# Patient Record
Sex: FEMALE | Race: WHITE | Employment: OTHER | URBAN - METROPOLITAN AREA
[De-identification: names, ages, dates, MRNs, and addresses within clinical notes are randomized per-mention and may not be internally consistent; named-entity substitution may affect disease eponyms.]

---

## 2017-01-01 ENCOUNTER — DOCUMENTATION ONLY (OUTPATIENT)
Dept: INTERNAL MEDICINE CLINIC | Age: 72
End: 2017-01-01

## 2017-01-01 ENCOUNTER — TELEPHONE (OUTPATIENT)
Dept: INTERNAL MEDICINE CLINIC | Age: 72
End: 2017-01-01

## 2017-01-01 DIAGNOSIS — G95.89 MYELOMALACIA OF CERVICAL CORD (HCC): ICD-10-CM

## 2017-01-01 DIAGNOSIS — G95.89 MYELOMALACIA OF CERVICAL CORD (HCC): Primary | ICD-10-CM

## 2017-01-01 DIAGNOSIS — E03.4 HYPOTHYROIDISM DUE TO ACQUIRED ATROPHY OF THYROID: ICD-10-CM

## 2017-01-01 DIAGNOSIS — I10 ESSENTIAL HYPERTENSION: ICD-10-CM

## 2017-01-01 DIAGNOSIS — M62.81 MUSCLE WEAKNESS (GENERALIZED): ICD-10-CM

## 2017-01-01 DIAGNOSIS — E03.4 HYPOTHYROIDISM DUE TO ACQUIRED ATROPHY OF THYROID: Primary | ICD-10-CM

## 2017-01-01 DIAGNOSIS — G80.9 CEREBRAL PALSY, UNSPECIFIED TYPE (HCC): Primary | ICD-10-CM

## 2017-01-01 DIAGNOSIS — G80.9 CEREBRAL PALSY, UNSPECIFIED TYPE (HCC): ICD-10-CM

## 2017-01-01 RX ORDER — CLONAZEPAM 1 MG/1
TABLET ORAL
Qty: 90 TAB | Refills: 0 | Status: SHIPPED | OUTPATIENT
Start: 2017-01-01

## 2017-01-01 RX ORDER — METOPROLOL TARTRATE 25 MG/1
TABLET, FILM COATED ORAL
Qty: 180 TAB | Refills: 3 | Status: SHIPPED | OUTPATIENT
Start: 2017-01-01

## 2017-01-01 RX ORDER — SULINDAC 200 MG/1
TABLET ORAL
Qty: 180 TAB | Refills: 3 | Status: SHIPPED | OUTPATIENT
Start: 2017-01-01

## 2017-01-01 RX ORDER — CLONAZEPAM 1 MG/1
TABLET ORAL
Qty: 90 TAB | Refills: 0 | Status: SHIPPED | OUTPATIENT
Start: 2017-01-01 | End: 2017-01-01 | Stop reason: SDUPTHER

## 2017-01-01 RX ORDER — MORPHINE SULFATE 20 MG/ML
10 SOLUTION ORAL
Qty: 120 ML | Refills: 0 | Status: SHIPPED | OUTPATIENT
Start: 2017-01-01

## 2017-01-01 RX ORDER — LEVOTHYROXINE SODIUM 137 UG/1
TABLET ORAL
Qty: 90 TAB | Refills: 0 | Status: SHIPPED | OUTPATIENT
Start: 2017-01-01

## 2017-01-01 RX ORDER — BACLOFEN 10 MG/1
TABLET ORAL
Qty: 90 TAB | Refills: 0 | Status: SHIPPED | OUTPATIENT
Start: 2017-01-01

## 2017-01-01 RX ORDER — HYDROCODONE BITARTRATE AND ACETAMINOPHEN 7.5; 325 MG/1; MG/1
1 TABLET ORAL
Qty: 120 TAB | Refills: 0 | Status: SHIPPED | OUTPATIENT
Start: 2017-01-01

## 2017-01-01 RX ORDER — BUPROPION HYDROCHLORIDE 100 MG/1
TABLET, EXTENDED RELEASE ORAL
Qty: 60 TAB | Refills: 5 | Status: SHIPPED | OUTPATIENT
Start: 2017-01-01

## 2017-01-01 RX ORDER — ROPINIROLE 0.25 MG/1
TABLET, FILM COATED ORAL
Qty: 180 TAB | Refills: 0 | Status: SHIPPED | OUTPATIENT
Start: 2017-01-01

## 2017-01-01 RX ORDER — DICLOFENAC SODIUM 50 MG/1
TABLET, DELAYED RELEASE ORAL
Qty: 60 TAB | Refills: 0 | Status: SHIPPED | OUTPATIENT
Start: 2017-01-01

## 2017-01-01 RX ORDER — LEVOTHYROXINE SODIUM 137 UG/1
TABLET ORAL
Qty: 90 TAB | Refills: 0 | Status: SHIPPED | OUTPATIENT
Start: 2017-01-01 | End: 2017-01-01 | Stop reason: SDUPTHER

## 2017-01-01 RX ORDER — DIAZEPAM 5 MG/1
5 TABLET ORAL
Qty: 60 TAB | Refills: 1 | Status: SHIPPED | OUTPATIENT
Start: 2017-01-01

## 2017-01-01 RX ORDER — AZITHROMYCIN 500 MG/1
500 TABLET, FILM COATED ORAL DAILY
Qty: 3 TAB | Refills: 0 | Status: SHIPPED | OUTPATIENT
Start: 2017-01-01 | End: 2017-04-23

## 2017-01-12 NOTE — PROGRESS NOTES
The patient is being evaluated by me today at home for the chief complaint of weakness    HPI     The patient remains on [address] [place] [address] for myelopathy in her cervical spine. She also has severe arthritis in the rest of her spine and multiple joints. She remains with spastic muscles secondary to cerebral palsy. She is spending more time sleeping. She is up in a wheel chair some but she tires quickly. She is taking thickened liquids for nutrition    Review of Systems   Respiratory: Negative for shortness of breath. Cardiovascular: Negative for chest pain and leg swelling. Musculoskeletal: Positive for joint pain and neck pain. Allergies   Allergen Reactions    Pcn [Penicillins] Rash and Itching    Zocor [Simvastatin] Other (comments)     Pain/cramping    Iodine Not Reported This Time    Statins-Hmg-Coa Reductase Inhibitors Swelling     Face/tongue swelling, tried 2 or 3 statins in past    Sulfa (Sulfonamide Antibiotics) Hives       Current Outpatient Prescriptions   Medication Sig Dispense Refill    clonazePAM (KLONOPIN) 1 mg tablet TAKE ONE TABLET BY MOUTH THREE TIMES DAILY 90 Tab 0    levothyroxine (SYNTHROID) 137 mcg tablet TAKE ONE TABLET BY MOUTH ONCE DAILY 90 Tab 0    diclofenac EC (VOLTAREN) 50 mg EC tablet TAKE ONE TABLET BY MOUTH TWICE DAILY WITH FOOD 60 Tab 0    HYDROcodone-acetaminophen (NORCO) 7.5-325 mg per tablet Take 1 Tab by mouth every six (6) hours as needed for Pain. Max Daily Amount: 4 Tabs. 120 Tab 0    rOPINIRole (REQUIP) 0.25 mg tablet TAKE ONE TABLET BY MOUTH TWICE DAILY 180 Tab 0    baclofen (LIORESAL) 10 mg tablet 1 tablet three times per day (stop Zanafex) 90 Tab 3    pantoprazole (PROTONIX) 40 mg tablet TAKE 1 TABLET EVERY MORNING 90 Tab 3    metoprolol tartrate (LOPRESSOR) 25 mg tablet Take 1 Tab by mouth two (2) times a day.  180 Tab 3    sulindac (CLINORIL) 200 mg tablet TAKE 1 TABLET TWICE DAILY WITH FOOD  - SUBSTITUTED FOR  CLINORIL 180 Tab 3    zolpidem (AMBIEN) 10 mg tablet 1 tablet at bedtime as needed for sleep 30 Tab 1    buPROPion SR (WELLBUTRIN SR) 100 mg SR tablet Take one tablet by mouth once daily in the morning and one tablet every after noon 60 Tab 5    predniSONE (DELTASONE) 20 mg tablet 3 tab daily for 2 days, 2 tab daily for 2 days, 1 tab daily for 4 days, 1/2 daily for 4 days 16 Tab 0    diazepam (VALIUM) 5 mg tablet Take 5 mg by mouth every six (6) hours as needed for Anxiety.  diphenoxylate-atropine (LOMOTIL) 2.5-0.025 mg per tablet Take 1 Tab by mouth four (4) times daily.  promethazine (PHENERGAN) 25 mg tablet TAKE ONE TABLET BY MOUTH TWICE DAILY AS NEEDED FOR NAUSEA 60 Tab 0    risperiDONE (RISPERDAL) 1 mg tablet Take 1 mg by mouth two (2) times a day.  colestipol (COLESTID) 1 gram tablet Take 1 g by mouth three (3) times daily as needed.  MEGESTROL ACETATE,MICRONIZED (MEGESTROL ACETATE,MICRO, BULK,) Take 40 mg by mouth daily.  aspirin 81 mg tablet Take 81 mg by mouth. Past Medical History   Diagnosis Date    Abnormal involuntary movements(781.0)     Abnormal nuclear cardiac imaging test 06/21/2011     [facility]:  Evidence of reversible inferolateral ischemia vs hot spot artifact. EF 83%. No WMA. Neg EKG on pharm stress test.    Acute laryngitis      And tracheitis    Cerebral palsy (HCC)     Chronic pain     Coronary atherosclerosis of native coronary artery      No cath yet, as no symptoms    Depression     Echocardiogram 06/27/2013     Tech difficult. EF 55-60%. No RWMA. Gr 1 DDfx. RVSP 30 mmHg.  Gait disturbance     Generalized anxiety disorder     Generalized osteoarthrosis, involving multiple sites     Holter monitor study 02/29/2016     Sinus rhythm. Rare single ventricular ectopic beats. One run of accelerated idioventricular rhythm. Rare supraventricular ectopics. Two runs of SVT. No symptoms reported.     Hyperlipidemia     Hypertension      History of elevated BP    Infectious diarrhea(009.2)     Insomnia     Loss of weight     Lumbar stenosis     Malaise and fatigue     Muscle weakness (generalized)     Old myocardial infarction     AZALEA on CPAP     Pre-operative cardiovascular examination      Neck surgery with neuro deficits    [facility]-neck deformity     Transfusion history      before 1992    Tremor     Unspecified hypothyroidism        Past Surgical History   Procedure Laterality Date    Hx other surgical       massive decompression and reconstruction (swan neck)    Hx other surgical       left shoulder    Hx other surgical       colon removal-partial    Hx back surgery       numerous back surgery - Cervical and Lumbar    Hx other surgical       wrist    Pr hand/finger surgery unlisted Bilateral 2001 2007     thumb       Social History     Social History    Marital status:      Spouse name: N/A    Number of children: N/A    Years of education: N/A     Occupational History    Not on file. Social History Main Topics    Smoking status: Never Smoker    Smokeless tobacco: Never Used    Alcohol use No    Drug use: No    Sexual activity: No     Other Topics Concern    Not on file     Social History Narrative       Patient does not have an advanced directive on file    VS:   /78 P 82 & reg Pulse Ox 93% (RA - supine)  Pain: 4/10 -  In multiple joints but fair control with rest and pain medications    Physical Exam   Cardiovascular: Normal rate and regular rhythm. Exam reveals no gallop. No murmur heard. Pulmonary/Chest: She has no wheezes. She has no rales. Abdominal: Soft. Bowel sounds are normal. She exhibits no distension and no mass. There is no tenderness. Musculoskeletal: She exhibits no edema. Marked limitation of passive range of motion of joints   Neurological:   Patient awakens to verbal stimuli.   She appears to recognize the examiner but she speaks very little       No visits with results within 3 Month(s) from this visit. Latest known visit with results is:    Hospital Outpatient Visit on 06/07/2016   Component Date Value Ref Range Status    Sed rate, automated 06/07/2016 30  0 - 30 mm/hr Final    WBC 06/07/2016 6.8  4.6 - 13.2 K/uL Final    RBC 06/07/2016 4.41  4.20 - 5.30 M/uL Final    HGB 06/07/2016 12.7  12.0 - 16.0 g/dL Final    HCT 06/07/2016 39.6  35.0 - 45.0 % Final    MCV 06/07/2016 89.8  74.0 - 97.0 FL Final    MCH 06/07/2016 28.8  24.0 - 34.0 PG Final    MCHC 06/07/2016 32.1  31.0 - 37.0 g/dL Final    RDW 06/07/2016 17.5* 11.6 - 14.5 % Final    PLATELET 50/48/1736 551  135 - 420 K/uL Final    MPV 06/07/2016 11.0  9.2 - 11.8 FL Final    NEUTROPHILS 06/07/2016 67  40 - 73 % Final    LYMPHOCYTES 06/07/2016 22  21 - 52 % Final    MONOCYTES 06/07/2016 10  3 - 10 % Final    EOSINOPHILS 06/07/2016 1  0 - 5 % Final    BASOPHILS 06/07/2016 0  0 - 2 % Final    ABS. NEUTROPHILS 06/07/2016 4.5  1.8 - 8.0 K/UL Final    ABS. LYMPHOCYTES 06/07/2016 1.5  0.9 - 3.6 K/UL Final    ABS. MONOCYTES 06/07/2016 0.7  0.05 - 1.2 K/UL Final    ABS. EOSINOPHILS 06/07/2016 0.1  0.0 - 0.4 K/UL Final    ABS.  BASOPHILS 06/07/2016 0.0  0.0 - 0.06 K/UL Final    DF 06/07/2016 AUTOMATED    Final    TSH 06/07/2016 0.79  0.36 - 3.74 uIU/mL Final    Sodium 06/07/2016 139  136 - 145 mmol/L Final    Potassium 06/07/2016 3.9  3.5 - 5.5 mmol/L Final    Chloride 06/07/2016 104  100 - 108 mmol/L Final    CO2 06/07/2016 23  21 - 32 mmol/L Final    Anion gap 06/07/2016 12  3.0 - 18 mmol/L Final    Glucose 06/07/2016 105* 74 - 99 mg/dL Final    BUN 06/07/2016 27* 7.0 - 18 MG/DL Final    Creatinine 06/07/2016 1.01  0.6 - 1.3 MG/DL Final    BUN/Creatinine ratio 06/07/2016 27* 12 - 20   Final    GFR est AA 06/07/2016 >60  >60 ml/min/1.73m2 Final    GFR est non-AA 06/07/2016 54* >60 ml/min/1.73m2 Final    Comment: (NOTE)  Estimated GFR is calculated using the Modification of Diet in Renal   Disease (MDRD) Study equation, reported for both  Americans   (GFRAA) and non- Americans (GFRNA), and normalized to 1.73m2   body surface area. The physician must decide which value applies to   the patient. The MDRD study equation should only be used in   individuals age 25 or older. It has not been validated for the   following: pregnant women, patients with serious comorbid conditions,   or on certain medications, or persons with extremes of body size,   muscle mass, or nutritional status.  Calcium 06/07/2016 9.5  8.5 - 10.1 MG/DL Final    Bilirubin, total 06/07/2016 0.5  0.2 - 1.0 MG/DL Final    ALT 06/07/2016 26  13 - 56 U/L Final    AST 06/07/2016 17  15 - 37 U/L Final    Alk. phosphatase 06/07/2016 76  45 - 117 U/L Final    Protein, total 06/07/2016 7.4  6.4 - 8.2 g/dL Final    Albumin 06/07/2016 3.8  3.4 - 5.0 g/dL Final    Globulin 06/07/2016 3.6  2.0 - 4.0 g/dL Final    A-G Ratio 06/07/2016 1.1  0.8 - 1.7   Final       .No results found for any visits on 01/11/17. Assessment / Plan:      ICD-10-CM ICD-9-CM    1. Myelomalacia of cervical cord (HCC) G95.89 336.8    2. Hypothyroidism due to acquired atrophy of thyroid E03.4 244.8      246.8    3. Cerebral palsy, unspecified type ([facility] [address] G80.9 343.9    4. Essential hypertension I10 401.9      Hospice Care  Check CMP  she was advised to continue her maintenance medications    Medications were reconciled with the patient during this visit    Follow up:  2 months    I asked the patient and her  for any questions and I answered their questions.   The  state that he understands the treatment plan and agree with the treatment plan      LOS:  [id number]

## 2017-01-23 NOTE — TELEPHONE ENCOUNTER
Requested Prescriptions     Pending Prescriptions Disp Refills    buPROPion SR (WELLBUTRIN SR) 100 mg SR tablet 60 Tab 5     Sig: Take one tablet by mouth once daily in the morning and one tablet every after noon

## 2017-02-28 NOTE — TELEPHONE ENCOUNTER
[facility]--will discuss with Dr Mccrary Hollow

## 2017-03-14 NOTE — TELEPHONE ENCOUNTER
Called patient to let her know that her RX for [address] [place] is ready for  in the office. Verbalized understanding.

## 2017-03-14 NOTE — TELEPHONE ENCOUNTER
Requested Prescriptions     Pending Prescriptions Disp Refills    HYDROcodone-acetaminophen (NORCO) 7.5-325 mg per tablet 120 Tab 0     Sig: Take 1 Tab by mouth every six (6) hours as needed for Pain. Max Daily Amount: 4 Tabs.

## 2017-04-20 NOTE — PROGRESS NOTES
The patient is being evaluated by me today at home for the chief complaint of difficulty breathing    HPI      The patient remains bedridden due     Review of Systems   Constitutional:        Unable to obtain any ROS from the patient but she appears comfortable       Allergies   Allergen Reactions    Pcn [Penicillins] Rash and Itching    Zocor [Simvastatin] Other (comments)     Pain/cramping    Iodine Not Reported This Time    Statins-Hmg-Coa Reductase Inhibitors Swelling     Face/tongue swelling, tried 2 or 3 statins in past    Sulfa (Sulfonamide Antibiotics) Hives       Current Outpatient Prescriptions   Medication Sig Dispense Refill    diazePAM (VALIUM) 5 mg tablet Take 1 Tab by mouth every six (6) hours as needed for Anxiety. Max Daily Amount: 20 mg. 60 Tab 1    morphine (ROXANOL) 100 mg/5 mL (20 mg/mL) concentrated solution Take 0.5 mL by mouth every two (2) hours as needed for Pain. Max Daily Amount: 120 mg. 120 mL 0    baclofen (LIORESAL) 10 mg tablet TAKE ONE TABLET BY MOUTH THREE TIMES DAILY (STOP ZANAFLEX) 90 Tab 0    levothyroxine (SYNTHROID) 137 mcg tablet TAKE ONE TABLET BY MOUTH ONCE DAILY 90 Tab 0    clonazePAM (KLONOPIN) 1 mg tablet TAKE ONE TABLET BY MOUTH THREE TIMES DAILY 90 Tab 0    sulindac (CLINORIL) 200 mg tablet TAKE 1 TABLET TWICE DAILY WITH FOOD  - SUBSTITUTED FOR  CLINORIL 180 Tab 3    HYDROcodone-acetaminophen (NORCO) 7.5-325 mg per tablet Take 1 Tab by mouth every six (6) hours as needed for Pain. Max Daily Amount: 4 Tabs.  120 Tab 0    metoprolol tartrate (LOPRESSOR) 25 mg tablet TAKE 1 TABLET TWICE DAILY (SUBSTITUTED FOR  LOPRESSOR) 180 Tab 3    rOPINIRole (REQUIP) 0.25 mg tablet TAKE ONE TABLET BY MOUTH TWICE DAILY 180 Tab 0    buPROPion SR (WELLBUTRIN SR) 100 mg SR tablet Take one tablet by mouth once daily in the morning and one tablet every after noon 60 Tab 5    diclofenac EC (VOLTAREN) 50 mg EC tablet TAKE ONE TABLET BY MOUTH TWICE DAILY WITH FOOD 60 Tab 0    pantoprazole (PROTONIX) 40 mg tablet TAKE 1 TABLET EVERY MORNING 90 Tab 3    zolpidem (AMBIEN) 10 mg tablet 1 tablet at bedtime as needed for sleep 30 Tab 1    predniSONE (DELTASONE) 20 mg tablet 3 tab daily for 2 days, 2 tab daily for 2 days, 1 tab daily for 4 days, 1/2 daily for 4 days 16 Tab 0    diphenoxylate-atropine (LOMOTIL) 2.5-0.025 mg per tablet Take 1 Tab by mouth four (4) times daily.  promethazine (PHENERGAN) 25 mg tablet TAKE ONE TABLET BY MOUTH TWICE DAILY AS NEEDED FOR NAUSEA 60 Tab 0    risperiDONE (RISPERDAL) 1 mg tablet Take 1 mg by mouth two (2) times a day.  colestipol (COLESTID) 1 gram tablet Take 1 g by mouth three (3) times daily as needed.  MEGESTROL ACETATE,MICRONIZED (MEGESTROL ACETATE,MICRO, BULK,) Take 40 mg by mouth daily.  aspirin 81 mg tablet Take 81 mg by mouth. Past Medical History:   Diagnosis Date    Abnormal involuntary movements(781.0)     Abnormal nuclear cardiac imaging test 06/21/2011    Cardiology Assoc:  Evidence of reversible inferolateral ischemia vs hot spot artifact. EF 83%. No WMA. Neg EKG on pharm stress test.    Acute laryngitis     And tracheitis    Cerebral palsy (HCC)     Chronic pain     Coronary atherosclerosis of native coronary artery     No cath yet, as no symptoms    Depression     Echocardiogram 06/27/2013    Tech difficult. EF 55-60%. No RWMA. Gr 1 DDfx. RVSP 30 mmHg.  Gait disturbance     Generalized anxiety disorder     Generalized osteoarthrosis, involving multiple sites     Holter monitor study 02/29/2016    Sinus rhythm. Rare single ventricular ectopic beats. One run of accelerated idioventricular rhythm. Rare supraventricular ectopics. Two runs of SVT. No symptoms reported.     Hyperlipidemia     Hypertension     History of elevated BP    Infectious diarrhea(009.2)     Insomnia     Loss of weight     Lumbar stenosis     Malaise and fatigue     Muscle weakness (generalized)     Old myocardial infarction     AZALEA on CPAP     Pre-operative cardiovascular examination     Neck surgery with neuro deficits    [facility]-neck deformity     Transfusion history     before 1992    Tremor     Unspecified hypothyroidism        Past Surgical History:   Procedure Laterality Date    HAND/FINGER SURGERY UNLISTED Bilateral 2001 2007    thumb    HX BACK SURGERY      numerous back surgery - Cervical and Lumbar    HX OTHER SURGICAL      massive decompression and reconstruction (swan neck)    HX OTHER SURGICAL      left shoulder    HX OTHER SURGICAL      colon removal-partial    HX OTHER SURGICAL      wrist       Social History     Social History    Marital status:      Spouse name: N/A    Number of children: N/A    Years of education: N/A     Occupational History    Not on file. Social History Main Topics    Smoking status: Never Smoker    Smokeless tobacco: Never Used    Alcohol use No    Drug use: No    Sexual activity: No     Other Topics Concern    Not on file     Social History Narrative       Patient does have an advanced directive on file    VS:    systolic palpable   P 830     Pulse Ox 94% on 2 Liters Nasal Prongs  Pain: 2/10    Physical Exam   Neck: Carotid bruit is not present. Cardiovascular: Exam reveals no gallop. No murmur heard. Pulmonary/Chest: She has no wheezes. She has no rales. Some increase in respiratory effort   Abdominal: Soft. Bowel sounds are normal. She exhibits no distension and no mass. There is no tenderness. Musculoskeletal: She exhibits no edema. Neurological:   The patient opes her eyes but does not respond other wise       No visits with results within 3 Month(s) from this visit.   Latest known visit with results is:    Hospital Outpatient Visit on 06/07/2016   Component Date Value Ref Range Status    Sed rate, automated 06/07/2016 30  0 - 30 mm/hr Final    WBC 06/07/2016 6.8  4.6 - 13.2 K/uL Final    RBC 06/07/2016 4.41  4.20 - 5.30 M/uL Final    HGB 06/07/2016 12.7  12.0 - 16.0 g/dL Final    HCT 06/07/2016 39.6  35.0 - 45.0 % Final    MCV 06/07/2016 89.8  74.0 - 97.0 FL Final    MCH 06/07/2016 28.8  24.0 - 34.0 PG Final    MCHC 06/07/2016 32.1  31.0 - 37.0 g/dL Final    RDW 06/07/2016 17.5* 11.6 - 14.5 % Final    PLATELET 10/25/8078 044  135 - 420 K/uL Final    MPV 06/07/2016 11.0  9.2 - 11.8 FL Final    NEUTROPHILS 06/07/2016 67  40 - 73 % Final    LYMPHOCYTES 06/07/2016 22  21 - 52 % Final    MONOCYTES 06/07/2016 10  3 - 10 % Final    EOSINOPHILS 06/07/2016 1  0 - 5 % Final    BASOPHILS 06/07/2016 0  0 - 2 % Final    ABS. NEUTROPHILS 06/07/2016 4.5  1.8 - 8.0 K/UL Final    ABS. LYMPHOCYTES 06/07/2016 1.5  0.9 - 3.6 K/UL Final    ABS. MONOCYTES 06/07/2016 0.7  0.05 - 1.2 K/UL Final    ABS. EOSINOPHILS 06/07/2016 0.1  0.0 - 0.4 K/UL Final    ABS. BASOPHILS 06/07/2016 0.0  0.0 - 0.06 K/UL Final    DF 06/07/2016 AUTOMATED    Final    TSH 06/07/2016 0.79  0.36 - 3.74 uIU/mL Final    Sodium 06/07/2016 139  136 - 145 mmol/L Final    Potassium 06/07/2016 3.9  3.5 - 5.5 mmol/L Final    Chloride 06/07/2016 104  100 - 108 mmol/L Final    CO2 06/07/2016 23  21 - 32 mmol/L Final    Anion gap 06/07/2016 12  3.0 - 18 mmol/L Final    Glucose 06/07/2016 105* 74 - 99 mg/dL Final    BUN 06/07/2016 27* 7.0 - 18 MG/DL Final    Creatinine 06/07/2016 1.01  0.6 - 1.3 MG/DL Final    BUN/Creatinine ratio 06/07/2016 27* 12 - 20   Final    GFR est AA 06/07/2016 >60  >60 ml/min/1.73m2 Final    GFR est non-AA 06/07/2016 54* >60 ml/min/1.73m2 Final    Comment: (NOTE)  Estimated GFR is calculated using the Modification of Diet in Renal   Disease (MDRD) Study equation, reported for both  Americans   (GFRAA) and non- Americans (GFRNA), and normalized to 1.73m2   body surface area. The physician must decide which value applies to   the patient. The MDRD study equation should only be used in   individuals age 25 or older.  It has not been validated for the   following: pregnant women, patients with serious comorbid conditions,   or on certain medications, or persons with extremes of body size,   muscle mass, or nutritional status.  Calcium 06/07/2016 9.5  8.5 - 10.1 MG/DL Final    Bilirubin, total 06/07/2016 0.5  0.2 - 1.0 MG/DL Final    ALT (SGPT) 06/07/2016 26  13 - 56 U/L Final    AST (SGOT) 06/07/2016 17  15 - 37 U/L Final    Alk. phosphatase 06/07/2016 76  45 - 117 U/L Final    Protein, total 06/07/2016 7.4  6.4 - 8.2 g/dL Final    Albumin 06/07/2016 3.8  3.4 - 5.0 g/dL Final    Globulin 06/07/2016 3.6  2.0 - 4.0 g/dL Final    A-G Ratio 06/07/2016 1.1  0.8 - 1.7   Final       .No results found for any visits on 04/19/17. Assessment / Plan:      ICD-10-CM ICD-9-CM    1. Cerebral palsy, unspecified type ([facility] [address] G80.9 343.9    2. Myelomalacia of cervical cord (HCC) G95.89 336.8    3. Hypothyroidism due to acquired atrophy of thyroid E03.4 244.8      246.8    4. Essential hypertension I10 401.9    5. Muscle weakness (generalized) M62.81 728.87      Add Morphine Sulfate Concentrate through Hospice  she was advised to continue her maintenance medications  Medication Reconciliation:  Medications were reconciled with the patient'  during this visit    Advised the  that th patient's condition is worsening and death may occur in the next week or so    Follow up: One month and with hospice care    I asked the patient and his  for any questions and I answered their questions.   They state that they understand the treatment plan and agree with the treatment plan      LOS:  [id number]